# Patient Record
(demographics unavailable — no encounter records)

---

## 2025-03-17 NOTE — CONSULT LETTER
[FreeTextEntry1] : Dear Dr. MITESH JOHNSON,  I had the pleasure of seeing HONORIO PENA for follow up today. Below is my note regarding the office visit today.  Thank you so very much for allowing me to participate in HONORIO's care. Please don't hesitate to call me should any questions or issues arise.  Sincerely, Baljeet Tejada MD, FACS, FSPU Chief, Pediatric Urology Professor of Urology and Pediatrics [employer]  President, [employer] - [place] Section Past-President, Societies for Pediatric Urology

## 2025-03-17 NOTE — HISTORY OF PRESENT ILLNESS
[TextBox_4] : I verified the identity of the patient and the reason for the appointment with the parent. I explained to the parent that telemedicine encounters are not the same as a direct patient/healthcare provider visit because the patient and healthcare provider are not in the same room, which can result in limitations, including with the physical examination. I explained that the telemedicine encounter may require the patient's genitalia to be shown. I explained that after the telemedicine encounter, the patient may require an office visit for an in-person physical examination, ultrasound or other testing. I informed the parent that there may be privacy risks associated with the use of the technology and that there may be costs associated with the encounter. I offered the option of an office visit rather than a telemedicine encounter. Parent stated that all explanations were understood, and that all questions were answered to their satisfaction. The parent verbalized their preference and consent to proceed with the telemedicine encounter.  Rafat is an 8-year-old male being seen today for follow up of bedwetting. Initially wet 4/7 nights per week. Since last being seen on 2/11/25, wet 3-4/7 nights. Mom states he had 5 accidents in the Month of February since his initial visit. When Ramadan started on 3/1/25, his nighttime accidents increased due to change in eating habits, fasting during daytime, and sleeping patterns. Has trialed implementing lifestyle factors such as decreasing fluids 2 hours before bedtime, practices double voiding and decreases bladder irritants. Patient using positive reinforce calendar for bedwetting. Parent would like to start Desmopressin. Denies UTI, interval infections or other voiding complaints.  RBUS from initial visit (2/11/25) was unremarkable.

## 2025-03-17 NOTE — REASON FOR VISIT
[Home] : at home, [unfilled] , at the time of the visit. [Medical Office: ([facility])___] : at the medical office located in  [Telehealth (audio & video)] : This visit was provided via telehealth using real-time 2-way audio visual technology. [Follow-Up Visit] : a follow-up visit [Enuresis] : enuresis [Mother] : mother [FreeTextEntry3] : Mother

## 2025-03-17 NOTE — ASSESSMENT
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 We spoke at length regarding the possible causes, anatomical considerations, and aggravators of incontinence. All treatment options, including lifestyle modifications, the nighttime wetting alarm and pharmacotherapy, were discussed. The following plan was decided upon:  - Continue Timed voiding and double voiding - Continue decreasing bladder irritants in the diet prior to bedtime - Continue limiting PO intake 2 hours prior to bedtime - Desmopressin 3 tablets at bedtime (MOA and side effects discussed). Discussed signs/symptoms of hyponatremia. Medication not to be given during periods of electrolyte imbalance (GI illness etc.) - Maintain positive reinforcement calendar   Rafat and parent verbalize understanding of the plan and state all questions were addressed to their satisfaction. Written instructions provided. Follow up recommended in 4 weeks. [facility]